# Patient Record
Sex: FEMALE | Race: BLACK OR AFRICAN AMERICAN | URBAN - METROPOLITAN AREA
[De-identification: names, ages, dates, MRNs, and addresses within clinical notes are randomized per-mention and may not be internally consistent; named-entity substitution may affect disease eponyms.]

---

## 2020-02-18 LAB
CREATININE, EXTERNAL: 0.6
LDL-C, EXTERNAL: 154

## 2020-04-27 ENCOUNTER — VIRTUAL VISIT (OUTPATIENT)
Dept: ENDOCRINOLOGY | Age: 60
End: 2020-04-27

## 2020-04-27 DIAGNOSIS — Z13.29 THYROID DISORDER SCREENING: ICD-10-CM

## 2020-04-27 DIAGNOSIS — E78.00 PURE HYPERCHOLESTEROLEMIA: Primary | ICD-10-CM

## 2020-04-27 RX ORDER — MELOXICAM 15 MG/1
TABLET ORAL
COMMUNITY
Start: 2020-04-11

## 2020-04-27 RX ORDER — FLUTICASONE PROPIONATE 50 MCG
SPRAY, SUSPENSION (ML) NASAL
COMMUNITY
Start: 2020-03-28

## 2020-04-27 RX ORDER — LIDOCAINE AND PRILOCAINE 25; 25 MG/G; MG/G
CREAM TOPICAL
COMMUNITY
Start: 2020-03-16

## 2020-04-27 RX ORDER — HYDROCHLOROTHIAZIDE 25 MG/1
TABLET ORAL
COMMUNITY
Start: 2020-02-07

## 2020-04-27 NOTE — PROGRESS NOTES
[facility] DIABETES AND ENDOCRINOLOGY               Moises Gonzales MD        [address] [phone] Fax [phone]       Patient Information  Date:4/28/2020  Name : Ute Toure 56 y.o.     YOB: 1960         Referred by: Ching Jack MD       Chief Complaint   Patient presents with   PatriceLatiaMyranda New Patient     referred for Hyperlipidemia     Pursuant to the emergency declaration under the [address] [place] [address] waiver authority and the [employer] Act, this Virtual  Visit was conducted, with patient's consent, to reduce the patient's risk of exposure to COVID-19 . Patient  is aware that this is a billable encounter and is responsible for copays/deductibles       Services were provided through a video synchronous discussion virtually to substitute for in-person clinic visit. Place of service: Provider : Office  Patient: Home  History of Present Illness: Ute Toure is a 56 y.o. female was referred for evaluation management of hyperlipidemia. She was initially diagnosed with high cholesterol when she was 44years old.   She has tried several statins including Lipitor, pravastatin, simvastatin, reported cramps and hence has been discontinued  She has not tried Livalo, however it is on the medication list when the records were reviewed  No personal history of CAD, CVA  No family history of premature coronary artery disease  Since February 2020 she has made changes to the diet, cut down fried foods, lost weight and she is currently 250 pounds  She also added flaxseed to her oatmeal    No history of hypothyroidism  No chest pain, shortness of breath,, fever, cough,   Intermittent joint pains  Constipation        Past Medical History:   Diagnosis Date    Hyperlipidemia     Hypertension      Past Surgical History:   Procedure Laterality Date    HX MYOMECTOMY       Current Outpatient Medications   Medication Sig    hydroCHLOROthiazide (HYDRODIURIL) 25 mg tablet TAKE 1 TABLET BY MOUTH EVERY DAY    meloxicam (MOBIC) 15 mg tablet TAKE 1 TABLET BY MOUTH DAILY WITH BREAKFAST. TAKE FOR 1 WEEK AND THEN AS NEEDED ONLY    fluticasone propionate (FLONASE) 50 mcg/actuation nasal spray USE 2 SPRAYS INTO EACH NOSTRIL ONCE DAILY    lidocaine-prilocaine (EMLA) topical cream      No current facility-administered medications for this visit. Allergies   Allergen Reactions    Augmentin [Amoxicillin-Pot Clavulanate] Other (comments)    Statins-Hmg-Coa Reductase Inhibitors Other (comments)         Review of Systems:  All 10 systems reviewed and are negative other than mentioned in HPI    Physical Examination:  There were no vitals taken for this visit. There is no height or weight on file to calculate BMI. - General: pleasant, no distress, good eye contact  - HEENT: no exophthalmos, no periorbital edema, EOMI  - Neck: No visible thyromegaly  - RS: Normal respiratory effort  - Musculoskeletal: no tremors  - Neurological: alert and oriented  - Psychiatric: normal mood and affect  - Skin: Normal color    Data Reviewed:     No results found for: HBA1C, DLN7RVEW, HGBE8, GLU, GESTF, GLUCPOC, MCACR, MCA1, MCA2, MCA3, MCAU, LDL, LDLC, DLDLP, SIMBA, CREAPOC, ACREA, CREA, REFC3, REFC4, CIC7CTFG, EXR0AEHB   No results found for: GFRNA, GFRNAPOC, GFRAA, GFRAAPOC, CREA, CREAPOC, BUN, IBUN, BUNPOC, NA, NAPOC, K, KPOCT, CL, CLPOC, CO2, CO2POC, MG, PHOS, ALBEU, PTH, PTHILT, EPO    Assessment/Plan:     1. Pure hypercholesterolemia    2.  Thyroid disorder screening      Pure hyperlipidemia: Could not tolerate statins  She has made changes to the diet, lost weight,  Recheck fasting lipid panel  Screen for hypothyroidism which can contribute to hyperlipidemia  No personal or family history of CAD  Risk factors age, hypertension  Discussed about other options  Await labs        Thank you for allowing me to participate in the care of this patient. Agata Frank MD      There are no Patient Instructions on file for this visit. Follow-up and Dispositions    · Return in about 6 months (around 10/27/2020). Patient /caregiver verbalized understanding . Voice-recognition software was used to generate this report, which may result in some phonetic-based errors in the grammar and contents. Even though attempts were made to correct all the mistakes, some may have been missed and remained in the body of the report.

## 2020-04-27 NOTE — PROGRESS NOTES
Yolanda Gallegos is a 56 y.o. female here for   Chief Complaint   Patient presents with    New Patient     referred for Hyperlipidemia     Pt consented to virtual visit. 1. Have you been to the ER, urgent care clinic since your last visit? Hospitalized since your last visit? -n/a    2. Have you seen or consulted any other health care providers outside of the [address] [place] [address] since your last visit?   Include any pap smears or colon screening.-n/a

## 2020-05-28 LAB
CHOLEST SERPL-MCNC: 246 MG/DL (ref 100–199)
HDLC SERPL-MCNC: 55 MG/DL
INTERPRETATION, 910389: NORMAL
LDLC SERPL CALC-MCNC: 168 MG/DL (ref 0–99)
TRIGL SERPL-MCNC: 117 MG/DL (ref 0–149)
TSH SERPL DL<=0.005 MIU/L-ACNC: 2.39 UIU/ML (ref 0.45–4.5)
VLDLC SERPL CALC-MCNC: 23 MG/DL (ref 5–40)

## 2020-05-29 NOTE — PROGRESS NOTES
Cholesterol is still high  She needs Livalo 2 mg at night, which is a brand medication and insurance will take time to approve as we have to send the paperwork.     If there is a free trial coupon please give to the patient  She has already tried simvastatin, pravastatin, atorvastatin, rosuvastatin in the past

## 2020-06-02 ENCOUNTER — TELEPHONE (OUTPATIENT)
Dept: ENDOCRINOLOGY | Age: 60
End: 2020-06-02

## 2020-06-02 DIAGNOSIS — E78.00 PURE HYPERCHOLESTEROLEMIA: Primary | ICD-10-CM

## 2020-06-02 NOTE — TELEPHONE ENCOUNTER
----- Message from Hali Briseno MD sent at 5/28/2020 10:01 PM EDT -----  Cholesterol is still high  She needs Livalo 2 mg at night, which is a brand medication and insurance will take time to approve as we have to send the paperwork.     If there is a free trial coupon please give to the patient  She has already tried simvastatin, pravastatin, atorvastatin, rosuvastatin in the past

## 2020-06-02 NOTE — TELEPHONE ENCOUNTER
Per Dr. Cory Dawson, informed pt of result note, as noted above. Pt verbalized understanding with no further questions or concerns at this time.

## 2020-12-16 ENCOUNTER — OFFICE VISIT (OUTPATIENT)
Dept: ENDOCRINOLOGY | Age: 60
End: 2020-12-16
Payer: COMMERCIAL

## 2020-12-16 VITALS
DIASTOLIC BLOOD PRESSURE: 77 MMHG | SYSTOLIC BLOOD PRESSURE: 174 MMHG | HEART RATE: 83 BPM | BODY MASS INDEX: 37.74 KG/M2 | OXYGEN SATURATION: 99 % | HEIGHT: 68 IN | RESPIRATION RATE: 18 BRPM | TEMPERATURE: 97.4 F | WEIGHT: 249 LBS

## 2020-12-16 DIAGNOSIS — I10 ESSENTIAL HYPERTENSION: ICD-10-CM

## 2020-12-16 DIAGNOSIS — E78.00 PURE HYPERCHOLESTEROLEMIA: Primary | ICD-10-CM

## 2020-12-16 PROCEDURE — 99214 OFFICE O/P EST MOD 30 MIN: CPT | Performed by: INTERNAL MEDICINE

## 2020-12-16 NOTE — PROGRESS NOTES
[facility]               Alice Mixon MD          Patient Information  Date:12/16/2020  Name : Gilma Augustine 56 y.o.     YOB: 1960         Referred by: Bianca David MD       Chief Complaint   Patient presents with    Cholesterol Problem       History of Present Illness: Gilma Augustine is a 56 y.o. female here for follow-up of hyperlipidemia  She was initially diagnosed with high cholesterol when she was 44years old. She has tried several statins including Lipitor, pravastatin, simvastatin, reported cramps and hence has been discontinued  She was started on Livalo May 2020, had myalgia discontinued    No personal history of CAD, CVA  No family history of premature coronary artery disease  She has made dietary changes  She also added flaxseed to her oatmeal    No history of hypothyroidism  No chest pain, shortness of breath,, fever, cough,   Intermittent joint pains  Constipation        Past Medical History:   Diagnosis Date    Hyperlipidemia     Hypertension      Past Surgical History:   Procedure Laterality Date    HX MYOMECTOMY       Current Outpatient Medications   Medication Sig    hydroCHLOROthiazide (HYDRODIURIL) 25 mg tablet TAKE 1 TABLET BY MOUTH EVERY DAY    meloxicam (MOBIC) 15 mg tablet TAKE 1 TABLET BY MOUTH DAILY WITH BREAKFAST. TAKE FOR 1 WEEK AND THEN AS NEEDED ONLY    fluticasone propionate (FLONASE) 50 mcg/actuation nasal spray USE 2 SPRAYS INTO EACH NOSTRIL ONCE DAILY    lidocaine-prilocaine (EMLA) topical cream     pitavastatin calcium (Livalo) 2 mg tablet Take 1 Tab by mouth nightly. No current facility-administered medications for this visit.       Allergies   Allergen Reactions    Augmentin [Amoxicillin-Pot Clavulanate] Other (comments)    Statins-Hmg-Coa Reductase Inhibitors Other (comments)         Review of Systems:  All 10 systems reviewed and are negative other than mentioned in HPI    Physical Examination:  Blood pressure (!) 174/77, pulse 83, temperature 97.4 °F (36.3 °C), temperature source Oral, resp. rate 18, height 5' 7.5\" (1.715 m), weight 249 lb (112.9 kg), SpO2 99 %. Body mass index is 38.42 kg/m². - General: pleasant, no distress, good eye contact  - HEENT: no exophthalmos, no periorbital edema, EOMI  - Neck: No visible thyromegaly  - RS: Normal respiratory effort  - Musculoskeletal: no tremors  - Neurological: alert and oriented  - Psychiatric: normal mood and affect  - Skin: Normal color    Data Reviewed:     Lab Results   Component Value Date/Time    LDL, calculated 168 (H) 05/27/2020 08:23 AM      No results found for: GFRNA, GFRNAPOC, GFRAA, GFRAAPOC, CREA, CREAPOC, BUN, IBUN, BUNPOC, NA, NAPOC, K, KPOCT, CL, CLPOC, CO2, CO2POC, MG, PHOS, ALBEU, PTH, PTHILT, EPO    Assessment/Plan:     1. Pure hypercholesterolemia      Pure hyperlipidemia: Could not tolerate statins  She has made changes to the diet,  Familial hypercholesterolemia  TSH normal  No personal or family history of CAD  Risk factors age, hypertension   livalo smaller dose, trial    Hypertension: Compliance with the medication, low-salt diet        Thank you for allowing me to participate in the care of this patient. Gita Klinefelter, MD      There are no Patient Instructions on file for this visit. Patient /caregiver verbalized understanding . Voice-recognition software was used to generate this report, which may result in some phonetic-based errors in the grammar and contents. Even though attempts were made to correct all the mistakes, some may have been missed and remained in the body of the report.

## 2020-12-16 NOTE — PROGRESS NOTES
Mario Marroquin is a 56 y.o. female here for   Chief Complaint   Patient presents with    Cholesterol Problem       1. Have you been to the ER, urgent care clinic since your last visit? Hospitalized since your last visit? -no    2. Have you seen or consulted any other health care providers outside of the [address] [place] [address] since your last visit?   Include any pap smears or colon screening.-no

## 2020-12-16 NOTE — LETTER
12/17/2020 Patient: Hortensia Jorgensen YOB: 1960 Date of Visit: 12/16/2020 Daryle Lango, MD 
[address] [address] [address] Fax: [phone] Dear Daryle Lango, MD, Thank you for referring Ms. Hortensia Jorgensen to [address] [place] [address] for evaluation. My notes for this consultation are attached. If you have questions, please do not hesitate to call me. I look forward to following your patient along with you. Sincerely, Lelia Hayes MD

## 2021-06-04 DIAGNOSIS — E78.00 PURE HYPERCHOLESTEROLEMIA: ICD-10-CM

## 2022-03-19 PROBLEM — E78.00 PURE HYPERCHOLESTEROLEMIA: Status: ACTIVE | Noted: 2020-04-27

## 2023-05-18 RX ORDER — LIDOCAINE AND PRILOCAINE 25; 25 MG/G; MG/G
CREAM TOPICAL
COMMUNITY
Start: 2020-03-16

## 2023-05-18 RX ORDER — FLUTICASONE PROPIONATE 50 MCG
SPRAY, SUSPENSION (ML) NASAL
COMMUNITY
Start: 2020-03-28

## 2023-05-18 RX ORDER — HYDROCHLOROTHIAZIDE 25 MG/1
1 TABLET ORAL DAILY
COMMUNITY
Start: 2020-02-07

## 2023-05-18 RX ORDER — MELOXICAM 15 MG/1
TABLET ORAL
COMMUNITY
Start: 2020-04-11

## 2025-02-18 ENCOUNTER — TELEPHONE (OUTPATIENT)
Age: 65
End: 2025-02-18

## 2025-02-18 NOTE — TELEPHONE ENCOUNTER
Reached out to schedule new PT appointment for neuropathy, PT stated that she didn't want to come this far. PT also stated she has a appointment for neurology in April